# Patient Record
Sex: FEMALE | Race: WHITE
[De-identification: names, ages, dates, MRNs, and addresses within clinical notes are randomized per-mention and may not be internally consistent; named-entity substitution may affect disease eponyms.]

---

## 2017-02-16 LAB
ALBUMIN SERPL-MCNC: 4.3 G/DL (ref 3.2–4.5)
ALT SERPL-CCNC: 61 U/L (ref 0–55)
ANION GAP SERPL CALC-SCNC: 9 MMOL/L (ref 5–14)
AST SERPL-CCNC: 20 U/L (ref 5–34)
BASOPHILS # BLD AUTO: 0 10^3/UL (ref 0–0.1)
BASOPHILS NFR BLD AUTO: 0 % (ref 0–10)
BILIRUB SERPL-MCNC: 0.5 MG/DL (ref 0.1–1)
BUN SERPL-MCNC: 19 MG/DL (ref 7–18)
BUN/CREAT SERPL: 23
CALCIUM SERPL-MCNC: 9.1 MG/DL (ref 8.5–10.1)
CHLORIDE SERPL-SCNC: 105 MMOL/L (ref 98–107)
CO2 SERPL-SCNC: 28 MMOL/L (ref 21–32)
CREAT SERPL-MCNC: 0.83 MG/DL (ref 0.6–1.3)
EOSINOPHIL # BLD AUTO: 0.1 10^3/UL (ref 0–0.3)
EOSINOPHIL NFR BLD AUTO: 2 % (ref 0–10)
ERYTHROCYTE [DISTWIDTH] IN BLOOD BY AUTOMATED COUNT: 16.6 % (ref 10–14.5)
GFR SERPLBLD BASED ON 1.73 SQ M-ARVRAT: > 60 ML/MIN
GLUCOSE SERPL-MCNC: 122 MG/DL (ref 70–105)
LYMPHOCYTES # BLD AUTO: 0.9 X 10^3 (ref 1–4)
LYMPHOCYTES NFR BLD AUTO: 15 % (ref 12–44)
MCH RBC QN AUTO: 23 PG (ref 25–34)
MCHC RBC AUTO-ENTMCNC: 34 G/DL (ref 32–36)
MCV RBC AUTO: 69 FL (ref 80–99)
MONOCYTES # BLD AUTO: 0.6 X 10^3 (ref 0–1)
MONOCYTES NFR BLD AUTO: 10 % (ref 0–12)
NEUTROPHILS # BLD AUTO: 4.3 X 10^3 (ref 1.8–7.8)
NEUTROPHILS NFR BLD AUTO: 73 % (ref 42–75)
PLATELET # BLD: 119 10^3/UL (ref 130–400)
PMV BLD AUTO: 10.1 FL (ref 7.4–10.4)
POTASSIUM SERPL-SCNC: 4.3 MMOL/L (ref 3.6–5)
PROT SERPL-MCNC: 6.9 G/DL (ref 6.4–8.2)
RBC # BLD AUTO: 5.63 10^6/UL (ref 4.35–5.85)
SODIUM SERPL-SCNC: 142 MMOL/L (ref 135–145)
WBC # BLD AUTO: 5.8 10^3/UL (ref 4.3–11)

## 2017-02-17 LAB
%SAT TOTAL IRON BINDING CAPIC: 12 % (ref 15–50)
TIBC SERPL-MCNC: 276 UG/DL (ref 280–380)

## 2017-02-18 LAB
FERRITIN SERPL-MCNC: 191 NG/ML (ref 15–150)
UIBC SERPL-MCNC: 244 UG/DL (ref 55–450)

## 2017-03-02 ENCOUNTER — HOSPITAL ENCOUNTER (OUTPATIENT)
Dept: HOSPITAL 75 - RAD | Age: 62
End: 2017-03-02
Attending: INTERNAL MEDICINE
Payer: MEDICARE

## 2017-03-02 DIAGNOSIS — Z12.31: Primary | ICD-10-CM

## 2017-03-02 PROCEDURE — 74177 CT ABD & PELVIS W/CONTRAST: CPT

## 2017-03-02 PROCEDURE — 77067 SCR MAMMO BI INCL CAD: CPT

## 2017-03-02 NOTE — DIAGNOSTIC IMAGING REPORT
INDICATION: Left lower quadrant pain.



CT of the abdomen and pelvis obtained with IV contrast bolus.



FINDINGS: Visualized portion of the lung bases show no

infiltrates. There are a couple of small pulmonary nodules in

left lower lobe, largest measured about 5 mm. There is a small

nodule in the right lower lobe as well measuring about 4 mm.

There is no pleural fluid. There is no free intraperitoneal air.

The liver and gallbladder appear unremarkable. There is

splenomegaly. The spleen measures about 17 cm in greatest axial

diameter and about 23 cm in length. The pancreas and adrenals

and kidneys are unremarkable. There is no retroperitoneal

adenopathy. Aorta is tortuous as are the iliac vessels without

aneurysmal disease. There is no sign of bowel obstruction. There

are diverticuli in the sigmoid and descending colon, without

acute diverticulitis.



IMPRESSION:



There is marked splenomegaly of uncertain etiology. There are

uncomplicated colonic diverticuli. There is no overt adenopathy.



There are small nodules in both lung bases as described above.

This may represent granulomata or small neoplasms. Suggest full

chest CT for further evaluation and followup as clinically

warranted.



Dictated by:



Dictated on workstation # [id number]

## 2017-03-03 NOTE — DIAGNOSTIC IMAGING REPORT
Bilateral screening mammogram



The current study was also evaluated with a Computer Aided

Detection (CAD) system.



INDICATION: Screening. No current complaints stated on the

questionnaire.



COMPARISON: 2/16/16.



FINDINGS:

Scattered fibroglandular densities are seen. There is a 6 mm

oval asymmetry along the central aspect of the right MLO view

with no definitive correlate on the CC projection. The left

breast demonstrates no definite change.



IMPRESSION: Focal compression view and ultrasound evaluation for

central right MLO view asymmetry is recommended.



ACR BI-RADS Category 0: Incomplete. (Needs additional imaging

evaluation).

Result letter will be mailed to the patient.

Note: At least 10% of breast cancer is not imaged by mammography.



Dictated by:



Dictated on workstation # [id number]

## 2017-04-11 ENCOUNTER — HOSPITAL ENCOUNTER (OUTPATIENT)
Dept: HOSPITAL 75 - RAD | Age: 62
End: 2017-04-11
Attending: INTERNAL MEDICINE
Payer: MEDICARE

## 2017-04-11 DIAGNOSIS — N60.01: Primary | ICD-10-CM

## 2017-04-11 DIAGNOSIS — R16.1: ICD-10-CM

## 2017-04-11 PROCEDURE — 76641 ULTRASOUND BREAST COMPLETE: CPT

## 2017-04-11 NOTE — DIAGNOSTIC IMAGING REPORT
EXAMINATION:

Right breast ultrasound.



INDICATION:

Focal asymmetry in the central upper outer aspect of the right

breast.



FINDINGS:

At the 10:30 o'clock position 3 cm from the nipple, there is a

simple cyst measuring 3 mm. This is slightly smaller compared to

the findings seen on mammography and it is uncertain if this is

a matching abnormality; however, the rest of the breast is

scanned with no other lesion seen in the retroareolar or the 4

quadrants of the right breast.



IMPRESSION:

There is a 3 mm simple cyst in the upper outer quadrant which

may correlate with the abnormality on mammography although the

size of the mammographic abnormality appears slightly larger. A

6 month right breast mammogram is recommended to ensure

stability.



ACR BI-RADS Category 3: Probably benign findings.



Dictated by:



Dictated on workstation # [id number]

## 2017-04-11 NOTE — DIAGNOSTIC IMAGING REPORT
Right breast diagnostic mammogram.



The current study was also evaluated with a Computer Aided

Detection (CAD) system.



INDICATION: Asymmetry along the central aspect of the right

breast.



FINDINGS: There is a 6 mm asymmetry along the lateral central

aspect of the right breast with focal compression views

demonstrating persistent 6 mm focal asymmetry on CC and lateral

projections with a probable underlying nodule or cyst.



IMPRESSION: Persistent focal asymmetry in the central slightly

lateral upper aspect of the right breast. Ultrasound evaluation

pending.



ACR BI-RADS Category 0: Incomplete. (Needs additional imaging

evaluation).

Result letter will be mailed to the patient.

Note: At least 10% of breast cancer is not imaged by mammography.



Dictated by:



Dictated on workstation # [id number]

## 2017-04-12 NOTE — DIAGNOSTIC IMAGING REPORT
EXAMINATION: PET-CT



TECHNIQUE:

Serum glucose level at the time of the study is: 136 mg/dL.

12.8 mCi of FDG was administered intravenously followed by

obtaining PET images with corresponding noncontrast CT scan

images. The CT scan was performed for anatomic correlation and

attenuation correction and was not performed according to the

diagnostic protocol of the areas covered. The scan was performed

from the head to mid thighs.



INDICATION: Splenomegaly.



FINDINGS:

There is symmetric uptake in the brain. In the neck, there are

areas of mild to moderate FDG uptake in the oropharyngeal

tonsils and lingual tonsils with symmetric appearance on

correlating CT scan of the soft tissues in favor of physiologic

activity. There is also increased FDG uptake in the left

paraspinal muscles in the upper to mid neck with symmetric

appearance on the correlating CT scan, likely related to muscle

contraction with no definite underlying lesion.



In the chest, there is physiologic activity in the heart with no

suspicious hypermetabolic mass or lymph node seen.



The spleen is markedly enlarged with no significant

hypermetabolism. There is no hypermetabolic enlarged lymph nodes

in the abdomen or pelvis seen. The bone marrow demonstrate

nonspecific heterogenous activity with no focal intensely

hypermetabolic mass.



IMPRESSION: There is markedly enlarged spleen with no

hypermetabolic activity noted. No suspicious hypermetabolic mass

is seen otherwise. Mild to moderate hypermetabolism around the

oropharynx is not associated with a convincing underlying mass

or asymmetry on the localizer CT and is favored to be

physiologic or inflammatory.



Dictated by:



Dictated on workstation # [id number]

## 2017-04-13 ENCOUNTER — HOSPITAL ENCOUNTER (OUTPATIENT)
Dept: HOSPITAL 75 - PAR | Age: 62
LOS: 34 days | Discharge: HOME | End: 2017-05-17
Attending: INTERNAL MEDICINE
Payer: MEDICARE

## 2017-04-13 DIAGNOSIS — M12.9: ICD-10-CM

## 2017-04-13 DIAGNOSIS — E66.01: ICD-10-CM

## 2017-04-13 DIAGNOSIS — D50.9: Primary | ICD-10-CM

## 2017-04-13 DIAGNOSIS — I10: ICD-10-CM

## 2017-04-13 DIAGNOSIS — K21.9: ICD-10-CM

## 2017-04-13 PROCEDURE — 80053 COMPREHEN METABOLIC PANEL: CPT

## 2017-04-13 PROCEDURE — 85025 COMPLETE CBC W/AUTO DIFF WBC: CPT

## 2017-04-13 PROCEDURE — 36415 COLL VENOUS BLD VENIPUNCTURE: CPT

## 2017-04-13 PROCEDURE — 82728 ASSAY OF FERRITIN: CPT

## 2017-04-13 PROCEDURE — 83540 ASSAY OF IRON: CPT

## 2017-04-13 PROCEDURE — 99213 OFFICE O/P EST LOW 20 MIN: CPT

## 2017-09-25 ENCOUNTER — HOSPITAL ENCOUNTER (OUTPATIENT)
Dept: HOSPITAL 75 - RAD | Age: 62
End: 2017-09-25
Attending: INTERNAL MEDICINE
Payer: MEDICARE

## 2017-09-25 DIAGNOSIS — R92.8: Primary | ICD-10-CM

## 2017-09-25 NOTE — DIAGNOSTIC IMAGING REPORT
INDICATION: Followup focal asymmetry in the central slightly

lateral aspect of the right breast.



EXAMINATION: Right breast diagnostic mammogram with tomography.



CAD is utilized.



The current study was also evaluated with a Computer Aided

Detection (CAD) system.



COMPARISON: 4/11/17



FINDINGS:





 Again seen is an asymmetry along the central slightly lateral

aspect of the right breast with no significant change from the

prior exams. Previously ultrasound was done with no suspicious

abnormality seen.



IMPRESSION: Focal asymmetry along the central-lateral aspect of

the right breast is again noted without significant change. This

may relate to summation artifact of parenchyma. Another followup

when the patient is due for her bilateral mammogram in March 2018

is recommended to ensure no adverse development.



 BI-RADS 3.



ACR BI-RADS Category 3: Probably benign findings.

Result letter will be mailed to the patient.

Note: At least 10% of breast cancer is not imaged by mammography.



Dictated by: 



  Dictated on workstation # [id number]

## 2017-10-12 ENCOUNTER — HOSPITAL ENCOUNTER (OUTPATIENT)
Dept: HOSPITAL 75 - ONC | Age: 62
LOS: 90 days | Discharge: HOME | End: 2018-01-10
Attending: INTERNAL MEDICINE
Payer: MEDICARE

## 2017-10-12 DIAGNOSIS — K21.9: ICD-10-CM

## 2017-10-12 DIAGNOSIS — E66.01: ICD-10-CM

## 2017-10-12 DIAGNOSIS — Z79.899: ICD-10-CM

## 2017-10-12 DIAGNOSIS — M12.9: ICD-10-CM

## 2017-10-12 DIAGNOSIS — D50.9: Primary | ICD-10-CM

## 2017-10-12 DIAGNOSIS — I10: ICD-10-CM

## 2017-10-12 LAB
ALBUMIN SERPL-MCNC: 4.2 GM/DL (ref 3.2–4.5)
ALP SERPL-CCNC: 71 U/L (ref 40–136)
ALT SERPL-CCNC: 13 U/L (ref 0–55)
BASOPHILS # BLD AUTO: 0 10^3/UL (ref 0–0.1)
BASOPHILS NFR BLD AUTO: 0 % (ref 0–10)
BILIRUB SERPL-MCNC: 0.6 MG/DL (ref 0.1–1)
BUN/CREAT SERPL: 29
CALCIUM SERPL-MCNC: 9.6 MG/DL (ref 8.5–10.1)
CHLORIDE SERPL-SCNC: 105 MMOL/L (ref 98–107)
CO2 SERPL-SCNC: 24 MMOL/L (ref 21–32)
CREAT SERPL-MCNC: 0.77 MG/DL (ref 0.6–1.3)
EOSINOPHIL # BLD AUTO: 0.1 10^3/UL (ref 0–0.3)
EOSINOPHIL NFR BLD AUTO: 1 % (ref 0–10)
ERYTHROCYTE [DISTWIDTH] IN BLOOD BY AUTOMATED COUNT: 16.7 % (ref 10–14.5)
GFR SERPLBLD BASED ON 1.73 SQ M-ARVRAT: > 60 ML/MIN
GLUCOSE SERPL-MCNC: 143 MG/DL (ref 70–105)
HCT VFR BLD CALC: 41 % (ref 35–52)
HGB BLD-MCNC: 13.7 G/DL (ref 11.5–16)
LYMPHOCYTES # BLD AUTO: 1 X 10^3 (ref 1–4)
LYMPHOCYTES NFR BLD AUTO: 13 % (ref 12–44)
MANUAL DIFFERENTIAL PERFORMED BLD QL: NO
MCH RBC QN AUTO: 24 PG (ref 25–34)
MCHC RBC AUTO-ENTMCNC: 33 G/DL (ref 32–36)
MCV RBC AUTO: 71 FL (ref 80–99)
MONOCYTES # BLD AUTO: 0.8 X 10^3 (ref 0–1)
MONOCYTES NFR BLD AUTO: 10 % (ref 0–12)
NEUTROPHILS # BLD AUTO: 6.4 X 10^3 (ref 1.8–7.8)
NEUTROPHILS NFR BLD AUTO: 77 % (ref 42–75)
PLATELET # BLD: 112 10^3/UL (ref 130–400)
PMV BLD AUTO: 9.7 FL (ref 7.4–10.4)
POTASSIUM SERPL-SCNC: 4.6 MMOL/L (ref 3.6–5)
PROT SERPL-MCNC: 7.5 GM/DL (ref 6.4–8.2)
RBC # BLD AUTO: 5.81 10^6/UL (ref 4.35–5.85)
SODIUM SERPL-SCNC: 139 MMOL/L (ref 135–145)
WBC # BLD AUTO: 8.3 10^3/UL (ref 4.3–11)

## 2017-10-12 PROCEDURE — 82728 ASSAY OF FERRITIN: CPT

## 2017-10-12 PROCEDURE — 80053 COMPREHEN METABOLIC PANEL: CPT

## 2017-10-12 PROCEDURE — 83540 ASSAY OF IRON: CPT

## 2017-10-12 PROCEDURE — 84443 ASSAY THYROID STIM HORMONE: CPT

## 2017-10-12 PROCEDURE — 36415 COLL VENOUS BLD VENIPUNCTURE: CPT

## 2017-10-12 PROCEDURE — 99213 OFFICE O/P EST LOW 20 MIN: CPT

## 2017-10-12 PROCEDURE — 85025 COMPLETE CBC W/AUTO DIFF WBC: CPT

## 2018-07-24 ENCOUNTER — HOSPITAL ENCOUNTER (OUTPATIENT)
Dept: HOSPITAL 75 - ONC | Age: 63
LOS: 7 days | Discharge: HOME | End: 2018-07-31
Attending: INTERNAL MEDICINE
Payer: MEDICARE

## 2018-07-24 DIAGNOSIS — M12.9: ICD-10-CM

## 2018-07-24 DIAGNOSIS — Z79.899: ICD-10-CM

## 2018-07-24 DIAGNOSIS — E66.01: ICD-10-CM

## 2018-07-24 DIAGNOSIS — K21.9: ICD-10-CM

## 2018-07-24 DIAGNOSIS — I10: ICD-10-CM

## 2018-07-24 DIAGNOSIS — D50.9: Primary | ICD-10-CM

## 2018-07-24 LAB
BASOPHILS # BLD AUTO: 0.1 10^3/UL (ref 0–0.1)
BASOPHILS NFR BLD AUTO: 1 % (ref 0–10)
EOSINOPHIL # BLD AUTO: 0.1 10^3/UL (ref 0–0.3)
EOSINOPHIL NFR BLD AUTO: 1 % (ref 0–10)
ERYTHROCYTE [DISTWIDTH] IN BLOOD BY AUTOMATED COUNT: 17.3 % (ref 10–14.5)
HCT VFR BLD CALC: 36 % (ref 35–52)
HGB BLD-MCNC: 12 G/DL (ref 11.5–16)
LYMPHOCYTES # BLD AUTO: 1.4 X 10^3 (ref 1–4)
LYMPHOCYTES NFR BLD AUTO: 15 % (ref 12–44)
MANUAL DIFFERENTIAL PERFORMED BLD QL: NO
MCH RBC QN AUTO: 26 PG (ref 25–34)
MCHC RBC AUTO-ENTMCNC: 33 G/DL (ref 32–36)
MCV RBC AUTO: 78 FL (ref 80–99)
MONOCYTES # BLD AUTO: 0.8 X 10^3 (ref 0–1)
MONOCYTES NFR BLD AUTO: 9 % (ref 0–12)
NEUTROPHILS # BLD AUTO: 6.8 X 10^3 (ref 1.8–7.8)
NEUTROPHILS NFR BLD AUTO: 75 % (ref 42–75)
PLATELET # BLD: 138 10^3/UL (ref 130–400)
PMV BLD AUTO: 9.5 FL (ref 7.4–10.4)
RBC # BLD AUTO: 4.64 10^6/UL (ref 4.35–5.85)
WBC # BLD AUTO: 9.1 10^3/UL (ref 4.3–11)

## 2018-07-24 PROCEDURE — 82728 ASSAY OF FERRITIN: CPT

## 2018-07-24 PROCEDURE — 85025 COMPLETE CBC W/AUTO DIFF WBC: CPT

## 2018-07-24 PROCEDURE — 83540 ASSAY OF IRON: CPT

## 2018-07-24 PROCEDURE — 36415 COLL VENOUS BLD VENIPUNCTURE: CPT

## 2018-07-24 PROCEDURE — 99213 OFFICE O/P EST LOW 20 MIN: CPT

## 2018-08-01 ENCOUNTER — HOSPITAL ENCOUNTER (OUTPATIENT)
Dept: HOSPITAL 75 - RAD | Age: 63
End: 2018-08-01
Attending: INTERNAL MEDICINE
Payer: MEDICARE

## 2018-08-01 DIAGNOSIS — R92.8: Primary | ICD-10-CM

## 2018-08-01 PROCEDURE — 77066 DX MAMMO INCL CAD BI: CPT

## 2018-08-01 NOTE — DIAGNOSTIC IMAGING REPORT
INDICATION: 

Six-month followup right breast density.



COMPARISON:  

03/02/2017 and 02/16/2016.



TECHNIQUE: 

2D and 3D bilateral diagnostic mammography was performed with

CAD.



FINDINGS:

Scattered fibroglandular densities are identified bilaterally.

The area of asymmetry in the upper-outer right breast is stable

and most likely represents fibroglandular tissue. No mass or

malignant appearing microcalcifications are seen. The axillae are

unremarkable.



IMPRESSION:   

No mammographic features suspicious for malignancy are

identified. The patient may return to routine annual screening

mammography.



ACR BI-RADS Category 1: Negative.

Result letter will be mailed to the patient.

Note:  At least 10% of breast cancer is not imaged by

mammography.



Dictated by: 



  Dictated on workstation # [id number]

## 2019-06-06 ENCOUNTER — HOSPITAL ENCOUNTER (OUTPATIENT)
Dept: HOSPITAL 75 - PREOP | Age: 64
Discharge: HOME | End: 2019-06-06
Attending: SURGERY
Payer: MEDICARE

## 2019-06-06 VITALS — WEIGHT: 255 LBS | BODY MASS INDEX: 40.98 KG/M2 | HEIGHT: 66 IN

## 2019-06-06 VITALS — SYSTOLIC BLOOD PRESSURE: 131 MMHG | DIASTOLIC BLOOD PRESSURE: 68 MMHG

## 2019-06-06 DIAGNOSIS — Z01.812: Primary | ICD-10-CM

## 2019-06-06 DIAGNOSIS — K82.4: ICD-10-CM

## 2019-06-06 DIAGNOSIS — Z11.2: ICD-10-CM

## 2019-06-06 LAB
ANISOCYTOSIS BLD QL SMEAR: SLIGHT
BASOPHILS # BLD AUTO: 0 10^3/UL (ref 0–0.1)
BASOPHILS NFR BLD AUTO: 0 % (ref 0–10)
BASOPHILS NFR BLD MANUAL: 0 %
EOSINOPHIL # BLD AUTO: 0.1 10^3/UL (ref 0–0.3)
EOSINOPHIL NFR BLD AUTO: 1 % (ref 0–10)
EOSINOPHIL NFR BLD MANUAL: 2 %
ERYTHROCYTE [DISTWIDTH] IN BLOOD BY AUTOMATED COUNT: 18.4 % (ref 10–14.5)
HCT VFR BLD CALC: 40 % (ref 35–52)
HGB BLD-MCNC: 13.2 G/DL (ref 11.5–16)
LYMPHOCYTES # BLD AUTO: 0.6 X 10^3 (ref 1–4)
LYMPHOCYTES NFR BLD AUTO: 7 % (ref 12–44)
MANUAL DIFFERENTIAL PERFORMED BLD QL: YES
MCH RBC QN AUTO: 25 PG (ref 25–34)
MCHC RBC AUTO-ENTMCNC: 33 G/DL (ref 32–36)
MCV RBC AUTO: 75 FL (ref 80–99)
MICROCYTES BLD QL SMEAR: SLIGHT
MONOCYTES # BLD AUTO: 0.8 X 10^3 (ref 0–1)
MONOCYTES NFR BLD AUTO: 9 % (ref 0–12)
MONOCYTES NFR BLD: 7 %
NEUTROPHILS # BLD AUTO: 7.6 X 10^3 (ref 1.8–7.8)
NEUTROPHILS NFR BLD AUTO: 84 % (ref 42–75)
NEUTS BAND NFR BLD MANUAL: 83 %
NEUTS BAND NFR BLD: 2 %
PLATELET # BLD: 147 10^3/UL (ref 130–400)
PMV BLD AUTO: 9.3 FL (ref 7.4–10.4)
VARIANT LYMPHS NFR BLD MANUAL: 6 %
WBC # BLD AUTO: 9.1 10^3/UL (ref 4.3–11)

## 2019-06-06 PROCEDURE — 87081 CULTURE SCREEN ONLY: CPT

## 2019-06-06 PROCEDURE — 85007 BL SMEAR W/DIFF WBC COUNT: CPT

## 2019-06-06 PROCEDURE — 85027 COMPLETE CBC AUTOMATED: CPT

## 2019-06-06 PROCEDURE — 36415 COLL VENOUS BLD VENIPUNCTURE: CPT

## 2019-06-12 ENCOUNTER — HOSPITAL ENCOUNTER (OUTPATIENT)
Dept: HOSPITAL 75 - SDC | Age: 64
Discharge: HOME | End: 2019-06-12
Attending: SURGERY
Payer: MEDICARE

## 2019-06-12 VITALS — DIASTOLIC BLOOD PRESSURE: 74 MMHG | SYSTOLIC BLOOD PRESSURE: 141 MMHG

## 2019-06-12 VITALS — SYSTOLIC BLOOD PRESSURE: 159 MMHG | DIASTOLIC BLOOD PRESSURE: 86 MMHG

## 2019-06-12 VITALS — HEIGHT: 66 IN | BODY MASS INDEX: 40.98 KG/M2 | WEIGHT: 255 LBS

## 2019-06-12 VITALS — DIASTOLIC BLOOD PRESSURE: 74 MMHG | SYSTOLIC BLOOD PRESSURE: 139 MMHG

## 2019-06-12 VITALS — SYSTOLIC BLOOD PRESSURE: 147 MMHG | DIASTOLIC BLOOD PRESSURE: 74 MMHG

## 2019-06-12 VITALS — SYSTOLIC BLOOD PRESSURE: 144 MMHG | DIASTOLIC BLOOD PRESSURE: 63 MMHG

## 2019-06-12 VITALS — SYSTOLIC BLOOD PRESSURE: 138 MMHG | DIASTOLIC BLOOD PRESSURE: 70 MMHG

## 2019-06-12 VITALS — DIASTOLIC BLOOD PRESSURE: 65 MMHG | SYSTOLIC BLOOD PRESSURE: 133 MMHG

## 2019-06-12 VITALS — DIASTOLIC BLOOD PRESSURE: 70 MMHG | SYSTOLIC BLOOD PRESSURE: 138 MMHG

## 2019-06-12 VITALS — DIASTOLIC BLOOD PRESSURE: 74 MMHG | SYSTOLIC BLOOD PRESSURE: 150 MMHG

## 2019-06-12 VITALS — DIASTOLIC BLOOD PRESSURE: 73 MMHG | SYSTOLIC BLOOD PRESSURE: 143 MMHG

## 2019-06-12 VITALS — DIASTOLIC BLOOD PRESSURE: 70 MMHG | SYSTOLIC BLOOD PRESSURE: 149 MMHG

## 2019-06-12 DIAGNOSIS — E66.9: ICD-10-CM

## 2019-06-12 DIAGNOSIS — D69.6: ICD-10-CM

## 2019-06-12 DIAGNOSIS — K80.10: Primary | ICD-10-CM

## 2019-06-12 DIAGNOSIS — I10: ICD-10-CM

## 2019-06-12 DIAGNOSIS — Z86.73: ICD-10-CM

## 2019-06-12 DIAGNOSIS — N32.81: ICD-10-CM

## 2019-06-12 DIAGNOSIS — Z87.891: ICD-10-CM

## 2019-06-12 DIAGNOSIS — K21.9: ICD-10-CM

## 2019-06-12 DIAGNOSIS — Z79.899: ICD-10-CM

## 2019-06-12 DIAGNOSIS — G47.33: ICD-10-CM

## 2019-06-12 DIAGNOSIS — F32.9: ICD-10-CM

## 2019-06-12 DIAGNOSIS — G62.9: ICD-10-CM

## 2019-06-12 DIAGNOSIS — K82.8: ICD-10-CM

## 2019-06-12 DIAGNOSIS — Z79.02: ICD-10-CM

## 2019-06-12 DIAGNOSIS — Z96.653: ICD-10-CM

## 2019-06-12 DIAGNOSIS — F41.9: ICD-10-CM

## 2019-06-12 DIAGNOSIS — J45.909: ICD-10-CM

## 2019-06-12 PROCEDURE — 94664 DEMO&/EVAL PT USE INHALER: CPT

## 2019-06-12 RX ADMIN — SODIUM CHLORIDE, SODIUM LACTATE, POTASSIUM CHLORIDE, AND CALCIUM CHLORIDE PRN MLS/HR: 600; 310; 30; 20 INJECTION, SOLUTION INTRAVENOUS at 08:49

## 2019-06-12 RX ADMIN — SODIUM CHLORIDE, SODIUM LACTATE, POTASSIUM CHLORIDE, AND CALCIUM CHLORIDE PRN MLS/HR: 600; 310; 30; 20 INJECTION, SOLUTION INTRAVENOUS at 07:38

## 2019-06-12 NOTE — OPERATIVE REPORT
DATE OF SERVICE:  06/12/2019



PREOPERATIVE DIAGNOSES:

Cholelithiasis, cholecystitis.



POSTOPERATIVE DIAGNOSES:

Cholelithiasis, cholecystitis, pending pathology.



PROCEDURE:

Laparoscopic cholecystectomy, intraoperative cholangiogram.



SURGEON:

Dario Arreola DO



FIRST ASSISTANT:

Donnell Montiel DO



ANESTHESIA:

General endotracheal tube.



SPECIMEN:

Gallbladder and contents.



BLOOD LOSS:

Scant.



FLUIDS:

Per anesthesia.



POSTOPERATIVE CONDITION:

Stable.



INDICATION FOR PROCEDURE:

The patient is a 63-year-old female who has been having pain in right upper

quadrant radiating straight to her back and an ultrasound showed a stone _____

possible polyp and dilated common bile duct.



FINDINGS:

The patient had adhesions to gallbladder, which is indicative of previous

gallbladder attacks and she had a very dilated common bile duct, but no stones

seen in the duct.



PROCEDURE NOTE:

After informed consent was obtained, the patient was brought to the operating

room, placed on the table in supine position.  She was sterilely prepped and

draped in normal fashion.  Local lidocaine was used to infiltrate the skin above

the umbilicus.  Made an incision with #11 blade, carried down through the skin

into subcutaneous tissue, deepened down through subcutaneous tissue with Bovie

electrocautery down to the fascia.  Fascia was incised with Bovie electrocautery

and bluntly entered the abdomen, swept a finger around, placed 0 Vicryl

figure-of-eight suture and placed an 11 mm trocar port under direct

visualization.  Created pneumoperitoneum and then placed 3 more ports in a

normal fashion using local lidocaine, 11 blade for stab incision and Versed

system, all done under direct visualization, one subxiphoid and 2 in the right

upper quadrant.  The patient then placed slightly reverse Trendelenburg.  Able

to visualize the gallbladder removed and grasped at the fundus.  There was lot

of adhesions to it, picture taken and then carefully started taking down the

adhesions with Bovie electrocautery as well as blunt dissection.  Once these

were able to completely take down, then able to grasp down to Bárbara's pouch

and pulled in the inferolateral direction, started dissecting out cystic duct,

able to get around the cystic duct and cystic artery and placed 1 clip distally

in the cystic duct and one distally and 2 proximally in the cystic artery.  Cut

the cystic duct senior living through Metzenbaum scissors.  Placed a cholangiogram

catheter, shot a cholangiogram.  Good spillage of dye down the common bile duct

into the small intestine as well as up into common hepatic.  It did not go all

the way up because of the duct being just so large _____ out of dye, but could

see that was going up into common hepatic and at this point then removed the

cholangiogram catheter, placed 2 clips proximally on the cystic duct and cut the

cystic duct and cystic artery with Metzenbaum scissors.  Removed the gallbladder

from the bed of the liver with L-hook cautery, which was completely removed,

placed a bag in the abdomen, placed the gallbladder in the bag and removed this

through a supraumbilical incision.  Placed the port back in the abdomen,

copiously irrigated with normal saline, suctioned out.  Obtained hemostasis in

the bed of the liver with L-hook cautery.  No bleeding at the end of the case. 

At this point, placed the patient supine.  Suctioned out all the fluid and then

suctioned out the pneumoperitoneum as well as allowed to escape and removed all

ports under direct visualization.  Closed supraumbilical incision, closing the

fascia with 0 Vicryl suture previously placed.  Copiously irrigated all

incisions with normal saline.  Next, closed the 3 small 5 mm incisions with a

single interrupted 4-0 undyed Monocryl subcuticular stitch.  Closed the

supraumbilical incision with 3 interrupted 4-0 undyed Monocryl subcuticular

stitches.  Area was cleaned and dried and Dermabond and Band-Aids placed.  The

patient is still in the operating room as I dictated, she is stable, she will be

transferred to recovery room.





Job ID: [id number]

DocumentID: [id number]

Dictated Date:  06/12/2019 09:21:00

Transcription Date: 06/12/2019 14:32:33

Dictated By: DARIO ARREOLA DO

## 2019-06-12 NOTE — ANESTHESIA-GENERAL POST-OP
General


Patient Condition


Mental Status/LOC:  Same as Preop


Cardiovascular:  Satisfactory


Nausea/Vomiting:  Absent


Respiratory:  Satisfactory


Pain:  Controlled


Complications:  Absent





Post Op Complications


Complications


None





Follow Up Care/Instructions


Patient Instructions


None needed.





Anesthesia/Patient Condition


Patient Condition


Patient is doing well, no complaints, stable vital signs, no apparent adverse 

anesthesia problems.   


No complications reported per nursing.











IFTIKHAR DORANTES CRNA              Jun 12, 2019 14:04

## 2019-06-12 NOTE — PROGRESS NOTE-POST OPERATIVE
Post-Operative Progess Note


Surgeon (s)/Assistant (s)


Surgeon


KEVIN MANJARREZ DO


Assistant:  Dinesh





Pre-Operative Diagnosis


Cholelithiasis/Cholecystitis





Post-Operative Diagnosis





same pending path





Procedure & Operative Findings


Date of Procedure


6/12/19


Procedure Performed/Findings


Lap tez with IOC


Anesthesia Type


GET





Estimated Blood Loss


Estimated blood loss (mL):  scant





Specimens/Packing


Specimens Removed


GB and contents











KEVIN MANJARREZ DO               Jun 12, 2019 09:21

## 2019-06-12 NOTE — DIAGNOSTIC IMAGING REPORT
Fluoroscopy



Indication:  Abdominal pain.



Fluoroscopic assistance was provided for Dr. Arreola during his

laparoscopic cholecystectomy procedure. 14 seconds of fluoroscopy

time was utilized.



72 spot films of the right upper quadrant were received. There

are laparoscopic devices in place. The common bile duct has been

opacified via a cystic duct catheter. The common duct is dilated

and there does seem to be a roughly 1 cm defect in the region of

the junction of the cystic duct and the common bile duct. There

is contrast extending into the small bowel.



Impression:  Fluoroscopic assistance was provided for Dr. Arreola.





Dictated by: 



  Dictated on workstation # [id number]

## 2019-06-12 NOTE — PROGRESS NOTE-PRE OPERATIVE
Pre-Operative Progress Note


H&P Reviewed


The H&P was reviewed, patient examined and no changes noted.


Time Seen by Provider:  08:16


Date H&P Reviewed:  Jun 12, 2019


Time H&P Reviewed:  08:17


Pre-Operative Diagnosis:  Cholelithiasis/Cholecystitis











KEVIN MANJARREZ DO               Jun 12, 2019 08:31

## 2019-06-12 NOTE — NUR
PATIENT MORE ALERT. O2 SAT 97% ON ROOM AIR. PATIENT UP TO THE BATHROOM. DISCHARGE 
INSTRUCTIONS WENT OVER WITH THE PATIENT AND HER .

## 2019-06-12 NOTE — NUR
RECEIVED PATIENT FROM PACU. O2 SAT 79% ON ROOM AIR AND PATIENT SHORT OF BREATH. APPLIED 
NON-REBREATHER. O2 SAT WENT BACK TO 96-99%. PATIENT WANTING HER C-PAP APPLIED. C-PAP APPLIED 
AT 1035. O2 SAT 92-96% WITH C-PAP.

## 2019-09-03 ENCOUNTER — HOSPITAL ENCOUNTER (OUTPATIENT)
Dept: HOSPITAL 75 - RAD | Age: 64
End: 2019-09-03
Attending: FAMILY MEDICINE
Payer: MEDICARE

## 2019-09-03 DIAGNOSIS — Z12.31: Primary | ICD-10-CM

## 2019-09-03 PROCEDURE — 77067 SCR MAMMO BI INCL CAD: CPT

## 2019-09-03 NOTE — DIAGNOSTIC IMAGING REPORT
INDICATION: 

Routine screening.



COMPARISON:    

08/01/2018 and 03/02/2017.



TECHNIQUE: 

2D and 3D bilateral screening mammography was performed with CAD.



FINDINGS:

Scattered fibroglandular densities are identified bilaterally.

The overall parenchymal pattern is stable. No dominant mass or

malignant appearing microcalcifications are seen. The axillae are

unremarkable.



IMPRESSION:  

No mammographic features suspicious for malignancy are

identified.



ACR BI-RADS Category 1: Negative.

Result letter will be mailed to the patient.

Note:  At least 10% of breast cancer is not imaged by

mammography.



Dictated by: 



  Dictated on workstation # [id number]

## 2019-09-19 ENCOUNTER — HOSPITAL ENCOUNTER (OUTPATIENT)
Dept: HOSPITAL 75 - PREOP | Age: 64
Discharge: HOME | End: 2019-09-19
Attending: SURGERY
Payer: MEDICARE

## 2019-09-19 VITALS — HEIGHT: 66.02 IN | WEIGHT: 257.5 LBS | BODY MASS INDEX: 41.38 KG/M2

## 2019-09-19 DIAGNOSIS — Z01.818: Primary | ICD-10-CM

## 2019-09-23 ENCOUNTER — HOSPITAL ENCOUNTER (OUTPATIENT)
Dept: HOSPITAL 75 - ENDO | Age: 64
End: 2019-09-23
Attending: SURGERY
Payer: MEDICARE

## 2019-09-23 VITALS — SYSTOLIC BLOOD PRESSURE: 105 MMHG | DIASTOLIC BLOOD PRESSURE: 62 MMHG

## 2019-09-23 VITALS — DIASTOLIC BLOOD PRESSURE: 62 MMHG | SYSTOLIC BLOOD PRESSURE: 102 MMHG

## 2019-09-23 VITALS — DIASTOLIC BLOOD PRESSURE: 51 MMHG | SYSTOLIC BLOOD PRESSURE: 93 MMHG

## 2019-09-23 VITALS — SYSTOLIC BLOOD PRESSURE: 91 MMHG | DIASTOLIC BLOOD PRESSURE: 54 MMHG

## 2019-09-23 VITALS — SYSTOLIC BLOOD PRESSURE: 111 MMHG | DIASTOLIC BLOOD PRESSURE: 68 MMHG

## 2019-09-23 VITALS — BODY MASS INDEX: 39.03 KG/M2 | WEIGHT: 257.5 LBS | HEIGHT: 67.99 IN

## 2019-09-23 VITALS — SYSTOLIC BLOOD PRESSURE: 93 MMHG | DIASTOLIC BLOOD PRESSURE: 51 MMHG

## 2019-09-23 DIAGNOSIS — Z90.49: ICD-10-CM

## 2019-09-23 DIAGNOSIS — D64.9: ICD-10-CM

## 2019-09-23 DIAGNOSIS — Z88.6: ICD-10-CM

## 2019-09-23 DIAGNOSIS — Z96.653: ICD-10-CM

## 2019-09-23 DIAGNOSIS — Z82.49: ICD-10-CM

## 2019-09-23 DIAGNOSIS — K21.0: ICD-10-CM

## 2019-09-23 DIAGNOSIS — K44.9: ICD-10-CM

## 2019-09-23 DIAGNOSIS — G47.33: ICD-10-CM

## 2019-09-23 DIAGNOSIS — F32.9: ICD-10-CM

## 2019-09-23 DIAGNOSIS — Z79.02: ICD-10-CM

## 2019-09-23 DIAGNOSIS — E66.01: ICD-10-CM

## 2019-09-23 DIAGNOSIS — Z79.899: ICD-10-CM

## 2019-09-23 DIAGNOSIS — K64.8: ICD-10-CM

## 2019-09-23 DIAGNOSIS — Z80.6: ICD-10-CM

## 2019-09-23 DIAGNOSIS — Z88.8: ICD-10-CM

## 2019-09-23 DIAGNOSIS — D69.6: ICD-10-CM

## 2019-09-23 DIAGNOSIS — K31.7: ICD-10-CM

## 2019-09-23 DIAGNOSIS — Z87.891: ICD-10-CM

## 2019-09-23 DIAGNOSIS — K57.30: ICD-10-CM

## 2019-09-23 DIAGNOSIS — Z83.3: ICD-10-CM

## 2019-09-23 DIAGNOSIS — K29.50: Primary | ICD-10-CM

## 2019-09-23 NOTE — ANESTHESIA-GENERAL POST-OP
MAC


Patient Condition


Mental Status/LOC:  Same as Preop


Cardiovascular:  Satisfactory


Nausea/Vomiting:  Absent


Respiratory:  Satisfactory


Pain:  Controlled


Complications:  Absent





Post Op Complications


Complications


None





Follow Up Care/Instructions


Patient Instructions


None needed.





Anesthesiology Discharge Order


Discharge Order


Patient is doing well, no complaints, stable vital signs, no apparent adverse 

anesthesia problems.   


No complications reported per nursing.











AQUILINO GILLESPIE CRNA            Sep 23, 2019 10:11

## 2019-09-23 NOTE — PROGRESS NOTE-POST OPERATIVE
Post-Operative Progess Note


Surgeon (s)/Assistant (s)


Surgeon


KEVIN MANJARREZ DO


Assistant:  none





Pre-Operative Diagnosis


nausea/vomiting, diarrhea





Post-Operative Diagnosis





Gastritis


Gastric Polyps


Hiatal Hernia


Diverticula


Int hemorrhoids





Procedure & Operative Findings


Date of Procedure


9/23/19


Procedure Performed/Findings


EGD with bx


colon


Anesthesia Type


IV sedation by CRNA





Estimated Blood Loss


Estimated blood loss (mL):  scant





Specimens/Packing


Specimens Removed


antral bx


body of stomach bx


GE jxn bx











KEVIN MANJARREZ DO               Sep 23, 2019 10:00

## 2019-09-23 NOTE — PROGRESS NOTE-PRE OPERATIVE
Pre-Operative Progress Note


H&P Reviewed


The H&P was reviewed, patient examined and no changes noted.


Time Seen by Provider:  08:10


Date H&P Reviewed:  Sep 23, 2019


Time H&P Reviewed:  08:11


Pre-Operative Diagnosis:  nausea/vomiting, diarrhea











KEVIN MANJARREZ DO               Sep 23, 2019 08:17

## 2019-09-23 NOTE — ENDOSCOPY DISCHARGE INSTRUCT
Endo Procedure/Findings


Findings


1.:  Gastritis


2.:  Hiatal Hernia


3.:  Diverticulosis


4.:  Internal Hemorrhoids





Discharge Instructions


-


Activity: You might feel a little sleepy until tomorrow.  This is due to the 

medicine you received to relax you.





Until tomorrow, you should:  


   NOT drive a car, operate machinery or power tools.


   NOT drink any alcoholic beverages.


   NOT make any important decisions or sign importortant papers.





Do not return to work until tomorrow, unless otherwise instructed. Resume 

previous activities tomorrow.





Diet: Start by taking liquids.  If you tolerate liquids, advance to solid food.





make appointment for one week


1.:  Colonscopy in 10 years, EGD in 1 year





Notify Physician


-


If you experience excessive bleeding, unusual abdominal pain, fever, or chest 

pain, contact your doctor immediately.











KEVIN MANJARREZ DO               Sep 23, 2019 10:01

## 2019-09-24 NOTE — OPERATIVE REPORT
DATE OF SERVICE:  09/23/2019



PREOPERATIVE DIAGNOSES:

1.  Nausea.

2.  Diarrhea.



POSTOPERATIVE DIAGNOSES:

1.  Gastritis.

2.  Gastric polyps.

3.  Hiatal hernia.

4.  Diverticula.

5.  Internal hemorrhoids.



PROCEDURES:

1.  EGD with biopsy.

2.  Colonoscopy.



SURGEON:

Dario Arreola DO



FIRST ASSISTANT:

None.



ANESTHESIA:

IV sedation by the CRNA.



SPECIMEN:

One biopsy from the antrum, one biopsy of body of stomach, one biopsy of the GE

junction.



BLOOD LOSS:

Scant.



FLUIDS:

Per anesthesia.



POSTOPERATIVE CONDITION:

Stable.



INDICATION FOR PROCEDURE:

The patient is a 63-year-old female who has been having some nausea and she also

had some diarrhea, needed an EGD and colonoscopy for workup.  She cannot

remember the last time she had a colonoscopy.



FINDINGS:

The patient had some gastritis and some gastric polyps, and she also had hiatal

hernia.  In the colon she had some diverticula and some internal hemorrhoids. 

No other obvious pathology.



PROCEDURE NOTE:

After informed consent was obtained, the patient was brought to the endoscopy

suite and placed in the left lateral decubitus position.  She was administered

IV sedation by the CRNA who then monitored her vitals the entire time, heart

rate, blood pressure and pulse ox and started with the EGD, placed the scope

down the mouth through the esophagus into the stomach.  Upon entering the

stomach, noted some gastritis, pushed into the duodenum.  Duodenum looked fine. 

Pulled the scope back and did a biopsy of the antrum and then did a biopsy the

body of stomach, saw some gastric polyps, took a picture of these and then

retroflexed the scope, saw a small hiatal hernia.  Then pulled the scope up into

the GE junction, did a biopsy of the GE junction, then pushed the scope back in,

suctioned the air out and then pulled the scope up the esophagus, looked like

there was some larger vein seen in the esophagus, but I am not really sure, this

would be varices.  Pulled the scope out, the patient tolerated the procedure.



Then changed gloves, changed the camera, and went down below, started the

colonoscopy.  Pushed all the way into about 150 cm, able to get all the way to

cecum, took a picture of appendiceal orifice, noted the ileocecal valve and able

to get into the terminal ileum and then slowly withdrew the scope insufflating

the circumferential walls looking at the cecum up the ascending colon to the

hepatic flexure, then down the transverse colon, the splenic flexure, into the

descending colon and through the descending colon and sigmoid, saw diverticula;

continued down into the descending colon into the sigmoid colon, then into the

rectum, retroflexed the rectal vault, saw some minimal internal hemorrhoids.  No

other obvious pathology.  Scope was removed.  The patient tolerated the

procedure well and she was recovered in endoscopy suite.





Job ID: [id number]

DocumentID: [id number]

Dictated Date:  09/23/2019 17:40:03

Transcription Date: 09/24/2019 01:33:44

Dictated By: DARIO ARREOLA DO

## 2022-10-12 ENCOUNTER — HOSPITAL ENCOUNTER (OUTPATIENT)
Dept: HOSPITAL 75 - PREOP | Age: 67
LOS: 6 days | Discharge: HOME | End: 2022-10-18
Attending: SURGERY
Payer: MEDICARE

## 2022-10-12 VITALS — BODY MASS INDEX: 40.63 KG/M2 | HEIGHT: 67.99 IN | WEIGHT: 268.08 LBS

## 2022-10-12 DIAGNOSIS — Z01.818: Primary | ICD-10-CM

## 2022-11-07 ENCOUNTER — HOSPITAL ENCOUNTER (OUTPATIENT)
Dept: HOSPITAL 75 - ENDO | Age: 67
Discharge: HOME | End: 2022-11-07
Attending: SURGERY
Payer: MEDICARE

## 2022-11-07 VITALS — WEIGHT: 268.08 LBS | BODY MASS INDEX: 40.63 KG/M2 | HEIGHT: 67.99 IN

## 2022-11-07 VITALS — SYSTOLIC BLOOD PRESSURE: 120 MMHG | DIASTOLIC BLOOD PRESSURE: 69 MMHG

## 2022-11-07 VITALS — SYSTOLIC BLOOD PRESSURE: 132 MMHG | DIASTOLIC BLOOD PRESSURE: 70 MMHG

## 2022-11-07 VITALS — SYSTOLIC BLOOD PRESSURE: 117 MMHG | DIASTOLIC BLOOD PRESSURE: 59 MMHG

## 2022-11-07 DIAGNOSIS — E66.01: ICD-10-CM

## 2022-11-07 DIAGNOSIS — K44.9: ICD-10-CM

## 2022-11-07 DIAGNOSIS — K31.89: ICD-10-CM

## 2022-11-07 DIAGNOSIS — Z87.891: ICD-10-CM

## 2022-11-07 DIAGNOSIS — K29.50: ICD-10-CM

## 2022-11-07 DIAGNOSIS — K31.7: Primary | ICD-10-CM

## 2022-11-07 DIAGNOSIS — Z79.01: ICD-10-CM

## 2022-11-07 NOTE — PROGRESS NOTE-POST OPERATIVE
Post-Operative Progess Note


Surgeon (s)/Assistant (s)


Surgeon


KEVIN MANJARREZ DO


Assistant:  none





Pre-Operative Diagnosis


Chronic Gastritis





Post-Operative Diagnosis





Gastritis


Hiatal hernia


Multiple gastric polyps





Procedure & Operative Findings


Date of Procedure


11/7/22


Procedure Performed/Findings


EGD with bx


EGD with hot bx removal of polyps





PROCEDURE NOTE:


After informed consent was obtained, the patient was brought to the endoscopy


suite, placed in bed in left lateral decubitus position.  She was administered


IV sedation by the CRNA who then monitored  vitals the entire time, heart


rate, blood pressure and pulse ox and the scope was inserted down the mouth


through the esophagus into the stomach.  On the way down, noted some mild


esophagitis, took a picture, pushed into the stomach, pushed past the antrum


into the duodenum.  Duodenum looked good.  Pulled back, noted gastritis and 


did a biopsy of the antrum,.  Then retroflexed the scope, saw a small hiatal 

hernia


and noted mulitple gastric polyps.  Next, pulled the scope into the GE junction,




took another picture of the hiatal hernia and then did a biopsy of the GE 

junction.  


Pushed the scope back into the stomach and elected to remove two polyps with


hot biopsy.  Suctioned all the air out of the stomach. At this point pulled the 


scope up the esophagus and out the mouth. 





The patient tolerated the procedure, and she recovered in endoscopy suite.


Anesthesia Type


IV sedation by CRNA





Estimated Blood Loss


Estimated blood loss (mL):  scant





Specimens/Packing


Specimens Removed


antral bx


GE jxn bx


Gastric polyps x 2











KEVIN MANJARREZ DO                Nov 7, 2022 10:48

## 2022-11-07 NOTE — ENDOSCOPY DISCHARGE INSTRUCT
Endo Procedure/Findings


Findings


1.:  Gastritis


2.:  Hiatal Hernia


3.:  Polyp (gastric)





Discharge Instructions


-


Activity: You might feel a little sleepy until tomorrow.  This is due to the 

medicine you received to relax you.





Until tomorrow, you should:  


   NOT drive a car, operate machinery or power tools.


   NOT drink any alcoholic beverages.


   NOT make any important decisions or sign importortant papers.





Do not return to work until tomorrow, unless otherwise instructed. Resume 

previous activities tomorrow.





Diet: Start by taking liquids.  If you tolerate liquids, advance to solid food.


1.:  EGD in 1 year





Notify Physician


-


If you experience excessive bleeding, unusual abdominal pain, fever, or chest 

pain, contact your doctor immediately.











KEVIN MANJARREZ DO                Nov 7, 2022 10:49

## 2022-11-07 NOTE — PROGRESS NOTE-PRE OPERATIVE
Pre-Operative Progress Note


Date of Available H&P:  Oct 11, 2022


Date H&P Reviewed:  Nov 7, 2022


Time H&P Reviewed:  08:51


History & Physical:  H&P Reviewed, Patient Examed, No changes noted


Pre-Operative Diagnosis:  Chronic Gastritis











KEVIN MANJARREZ DO                Nov 7, 2022 08:54

## 2024-11-20 NOTE — DISCHARGE INST-SURGICAL
Discharge Inst-Surgical


Depart Medication/Instructions


New, Converted or Re-Newed RX:  Other (pt has home meds)


Patient Instructions


Follow up Appt:


Make appointment for 1 week. [phone]





Instructions:


No lifting greater than 20 pounds.


No strenuous activity. 


May shower in 24 hours, no tub bath or soaking.


Use incentive spirometer at home as directed.


No Smoking





Skin/Wound Care:


May remove bandages in am.  You need to leave the Dermabond on incision it will 

fall off on it's own. 





Symptoms to Report:


Appetite Changes, Extremity Discoloration, Numbness/Tingling, Swelling 

Increased, Bleeding Excessive, Eyesight Changes, Pain Increased, Urine Color 

Change, Constipation(Persistent), Fever over 101 degree F, Pain/Pressure in 

chest, Urinating Difficulty, Cough Up/Vomit Blood, Heart Beat Irreg/Pounding, 

Pain/Pressure in jaw, Cramps in feet or legs, Lightheadedness, Pain/Pressure in 

shoulder, Diarrhea(Persistent), Memory Changes Suddenly, Questions/Concerns, 

Weight gain consecutive days, Dizziness/Fainting, Nausea/Vomiting, Shortness of 

Breath, Weight gain over 2 pounds








If questions or concerns contact your physician 


Or seek help at emergency department.





Activity


Activity as Tolerated:  Yes


Activity Instructions:  Avoid Stress to Incision


Driving Instructions:  No Driving/Refer to 





Diet


Discharge Diet:  Avoid Fatty Foods, Low Fat/Low Cholesterol


Diet After 24 Hours:  Clear Liquid if Nauseous


If Any Problems/Questions/Issu:  Contact Your Physician, Go to Emergency Room





Skin/Wound Care


Infection Signs and Symptoms:  Increased Redness, Foul Odor of Wound, Increased 

Drainage, Skin Itchy or Has a Rash, Increased Swelling, Temperature Above 101  F


Wound Care Comment:  


Heating pad to shoulder or neck tonight for pain


Bathing Instructions:  Shower


Stitches/Staples/Dermabond Dis:  Dermabond


Ice Pack:  Ice On and Off Site (as needed for pain at incision sites)











KEVIN MANJARREZ DO               Jun 12, 2019 09:23
than less active. All activity done in each category counts toward your weekly total. You'd be surprised how daily things like carrying groceries, keeping up with grandchildren, and taking the stairs can add up.  What keeps you from being active?  If you've had a hard time being more active, you're not alone. Maybe you remember being able to do more. Or maybe you've never thought of yourself as being active. It's frustrating when you can't do the things you want. Being more active can help. What's holding you back?  Getting started.  Have a goal, but break it into easy tasks. Small steps build into big accomplishments.  Staying motivated.  If you feel like skipping your activity, remember your goal. Maybe you want to move better and stay independent. Every activity gets you one step closer.  Not feeling your best.  Start with 5 minutes of an activity you enjoy. Prove to yourself you can do it. As you get comfortable, increase your time.  You may not be where you want to be. But you're in the process of getting there. Everyone starts somewhere.  How can you find safe ways to stay active?  Talk with your doctor about any physical challenges you're facing. Make a plan with your doctor if you have a health problem or aren't sure how to get started with activity.  If you're already active, ask your doctor if there is anything you should change to stay safe as your body and health change.  If you tend to feel dizzy after you take medicine, avoid activity at that time. Try being active before you take your medicine. This will reduce your risk of falls.  If you plan to be active at home, make sure to clear your space before you get started. Remove things like TV cords, coffee tables, and throw rugs. It's safest to have plenty of space to move freely.  The key to getting more active is to take it slow and steady. Try to improve only a little bit at a time. Pick just one area to improve on at first. And if an activity hurts,